# Patient Record
Sex: FEMALE | Race: WHITE | NOT HISPANIC OR LATINO | URBAN - METROPOLITAN AREA
[De-identification: names, ages, dates, MRNs, and addresses within clinical notes are randomized per-mention and may not be internally consistent; named-entity substitution may affect disease eponyms.]

---

## 2023-01-01 ENCOUNTER — NURSING HOME VISIT (OUTPATIENT)
Dept: POST ACUTE CARE | Facility: EXTERNAL LOCATION | Age: 88
End: 2023-01-01
Payer: MEDICARE

## 2023-01-01 VITALS — DIASTOLIC BLOOD PRESSURE: 56 MMHG | SYSTOLIC BLOOD PRESSURE: 99 MMHG | HEART RATE: 87 BPM

## 2023-01-01 VITALS — WEIGHT: 124 LBS | HEART RATE: 85 BPM | DIASTOLIC BLOOD PRESSURE: 79 MMHG | SYSTOLIC BLOOD PRESSURE: 124 MMHG

## 2023-01-01 VITALS — DIASTOLIC BLOOD PRESSURE: 76 MMHG | SYSTOLIC BLOOD PRESSURE: 116 MMHG | WEIGHT: 124 LBS | HEART RATE: 76 BPM

## 2023-01-01 VITALS — HEART RATE: 100 BPM | WEIGHT: 125 LBS | DIASTOLIC BLOOD PRESSURE: 64 MMHG | SYSTOLIC BLOOD PRESSURE: 103 MMHG

## 2023-01-01 VITALS — DIASTOLIC BLOOD PRESSURE: 60 MMHG | SYSTOLIC BLOOD PRESSURE: 105 MMHG | HEART RATE: 68 BPM | WEIGHT: 119 LBS

## 2023-01-01 DIAGNOSIS — U07.1 COVID-19: Primary | ICD-10-CM

## 2023-01-01 DIAGNOSIS — F02.C0 SEVERE ALZHEIMER'S DEMENTIA OF OTHER ONSET WITHOUT BEHAVIORAL DISTURBANCE, PSYCHOTIC DISTURBANCE, MOOD DISTURBANCE, OR ANXIETY (MULTI): Primary | ICD-10-CM

## 2023-01-01 DIAGNOSIS — G30.8 SEVERE ALZHEIMER'S DEMENTIA OF OTHER ONSET WITHOUT BEHAVIORAL DISTURBANCE, PSYCHOTIC DISTURBANCE, MOOD DISTURBANCE, OR ANXIETY (MULTI): Primary | ICD-10-CM

## 2023-01-01 DIAGNOSIS — I10 PRIMARY HYPERTENSION: ICD-10-CM

## 2023-01-01 DIAGNOSIS — R54 SENILE DEBILITY: ICD-10-CM

## 2023-01-01 DIAGNOSIS — F02.C0 SEVERE ALZHEIMER'S DEMENTIA OF OTHER ONSET WITHOUT BEHAVIORAL DISTURBANCE, PSYCHOTIC DISTURBANCE, MOOD DISTURBANCE, OR ANXIETY (MULTI): ICD-10-CM

## 2023-01-01 DIAGNOSIS — G30.8 SEVERE ALZHEIMER'S DEMENTIA OF OTHER ONSET WITHOUT BEHAVIORAL DISTURBANCE, PSYCHOTIC DISTURBANCE, MOOD DISTURBANCE, OR ANXIETY (MULTI): ICD-10-CM

## 2023-01-01 PROCEDURE — 99308 SBSQ NF CARE LOW MDM 20: CPT | Performed by: INTERNAL MEDICINE

## 2023-01-01 ASSESSMENT — ENCOUNTER SYMPTOMS
CARDIOVASCULAR NEGATIVE: 1
RESPIRATORY NEGATIVE: 1
JOINT SWELLING: 0
ACTIVITY CHANGE: 0
APPETITE CHANGE: 0
SORE THROAT: 0
NERVOUS/ANXIOUS: 1
WEAKNESS: 1
DIZZINESS: 0
ARTHRALGIAS: 1
WOUND: 0
GASTROINTESTINAL NEGATIVE: 1

## 2023-03-22 NOTE — LETTER
Patient: Felicity Girard  : 1925    Encounter Date: 2023    Subjective  Patient ID: Felicity Girard is a 89 y.o. female who is long term resident being seen and evaluated for multiple medical problems.    89-year-old female patient was seen for routine care visit, she is deteriorating, she is absolutely not showing any quality of life, patient has a severe dementia, patient is total care, patient cannot sustain any mobility, patient is not able to complain anything, she is essentially bedbound chair bound, weight has not changed significantly in the beginning but now she is losing weight, family wants absolute comfort measures, we are not giving any specific therapeutics, supportive care and conservative care has been given.  Family is aware about poor prognosis.         Review of Systems   Constitutional:  Negative for activity change and appetite change.   HENT:  Negative for sore throat.    Respiratory: Negative.     Cardiovascular: Negative.    Gastrointestinal: Negative.    Genitourinary:  Negative for enuresis and urgency.   Musculoskeletal:  Positive for arthralgias. Negative for joint swelling.   Skin: Negative.  Negative for wound.   Neurological:  Positive for weakness. Negative for dizziness.   Psychiatric/Behavioral:  The patient is nervous/anxious.        Objective  There were no vitals taken for this visit.    Physical Exam  Vitals reviewed.   Constitutional:       Appearance: Normal appearance. She is normal weight.   HENT:      Head: Normocephalic and atraumatic.   Eyes:      Conjunctiva/sclera: Conjunctivae normal.   Cardiovascular:      Rate and Rhythm: Normal rate and regular rhythm.   Pulmonary:      Effort: Pulmonary effort is normal.      Breath sounds: Normal breath sounds.   Abdominal:      Palpations: Abdomen is soft.   Musculoskeletal:         General: Normal range of motion.      Cervical back: Normal range of motion.   Skin:     General: Skin is warm and dry.   Neurological:       General: No focal deficit present.   Psychiatric:         Cognition and Memory: Cognition is impaired. Memory is impaired. She exhibits impaired recent memory.         Assessment/Plan  Problem List Items Addressed This Visit          Nervous    Dementia (CMS/HCC) - Primary       Circulatory    Hypertension   Medications are reviewed, no test or investigations required, complete care and assistance has been provided, prognosis is poor, discussed with nursing staff, at any time hospice like care can be initiated, patient has a severe dementia to the point of total care and dependence and no spontaneous conversation or communication.     Goals    None           Electronically Signed By: Asaf Gilliland MD   3/24/23 10:56 PM

## 2023-03-24 PROBLEM — I10 HYPERTENSION: Status: ACTIVE | Noted: 2023-01-01

## 2023-03-24 PROBLEM — F03.90 DEMENTIA (MULTI): Status: ACTIVE | Noted: 2023-01-01

## 2023-03-25 NOTE — PROGRESS NOTES
Subjective   Patient ID: Felicity Girard is a 89 y.o. female who is long term resident being seen and evaluated for multiple medical problems.    89-year-old female patient was seen for routine care visit, she is deteriorating, she is absolutely not showing any quality of life, patient has a severe dementia, patient is total care, patient cannot sustain any mobility, patient is not able to complain anything, she is essentially bedbound chair bound, weight has not changed significantly in the beginning but now she is losing weight, family wants absolute comfort measures, we are not giving any specific therapeutics, supportive care and conservative care has been given.  Family is aware about poor prognosis.         Review of Systems   Constitutional:  Negative for activity change and appetite change.   HENT:  Negative for sore throat.    Respiratory: Negative.     Cardiovascular: Negative.    Gastrointestinal: Negative.    Genitourinary:  Negative for enuresis and urgency.   Musculoskeletal:  Positive for arthralgias. Negative for joint swelling.   Skin: Negative.  Negative for wound.   Neurological:  Positive for weakness. Negative for dizziness.   Psychiatric/Behavioral:  The patient is nervous/anxious.        Objective   There were no vitals taken for this visit.    Physical Exam  Vitals reviewed.   Constitutional:       Appearance: Normal appearance. She is normal weight.   HENT:      Head: Normocephalic and atraumatic.   Eyes:      Conjunctiva/sclera: Conjunctivae normal.   Cardiovascular:      Rate and Rhythm: Normal rate and regular rhythm.   Pulmonary:      Effort: Pulmonary effort is normal.      Breath sounds: Normal breath sounds.   Abdominal:      Palpations: Abdomen is soft.   Musculoskeletal:         General: Normal range of motion.      Cervical back: Normal range of motion.   Skin:     General: Skin is warm and dry.   Neurological:      General: No focal deficit present.   Psychiatric:         Cognition  and Memory: Cognition is impaired. Memory is impaired. She exhibits impaired recent memory.         Assessment/Plan   Problem List Items Addressed This Visit          Nervous    Dementia (CMS/HCC) - Primary       Circulatory    Hypertension   Medications are reviewed, no test or investigations required, complete care and assistance has been provided, prognosis is poor, discussed with nursing staff, at any time hospice like care can be initiated, patient has a severe dementia to the point of total care and dependence and no spontaneous conversation or communication.     Goals    None

## 2023-04-26 NOTE — LETTER
Patient: Felicity Girard  : 1925    Encounter Date: 2023    Subjective  Patient ID: Felicity Girard is a 89 y.o. female who is acute skilled care being seen and evaluated for multiple medical problems.    89-year-old female patient was seen for follow-up, condition continued to deteriorate, more than 10% weight loss is seen, patient remains under hospice care, she is bedbound, she is lethargic, family member was not present, atenolol and donepezil has been kept, very scant therapeutics has been kept for several years she has been here, no communication, appetite remains poor, oral feeding has been deteriorating, patient's prognosis continue to decline significantly, patient is kept on a maximum comfort measures, severe dementia is there.         Review of Systems  Constitutional:  Negative for activity change and appetite change.   HENT:  Negative for sore throat.    Respiratory: Negative.     Cardiovascular: Negative.    Gastrointestinal: Negative.    Genitourinary:  Negative for enuresis and urgency.   Musculoskeletal:  Positive for arthralgias. Negative for joint swelling.   Skin: Negative.  Negative for wound.   Neurological:  Positive for weakness. Negative for dizziness.   Psychiatric/Behavioral:  The patient is letharic  Objective  /60   Pulse 68   Wt 54 kg (119 lb)     Physical Exam  Vitals reviewed.   Constitutional:       Appearance: Normal appearance. She is normal weight.   HENT:      Head: Normocephalic and atraumatic.   Eyes:      Conjunctiva/sclera: Conjunctivae normal.   Cardiovascular:      Rate and Rhythm: Normal rate and regular rhythm.   Pulmonary:      Effort: Pulmonary effort is normal.      Breath sounds: Normal breath sounds.   Abdominal:      Palpations: Abdomen is soft.   Musculoskeletal:         General: Normal range of motion.      Cervical back: Normal range of motion.   Skin:     General: Skin is warm and dry.   Neurological:      General: No focal deficit present.    Psychiatric:         Cognition and Memory: Cognition is impaired. Memory is impaired. She exhibits impaired recent memory.         Assessment/Plan  Problem List Items Addressed This Visit          Nervous    Dementia (CMS/HCC) - Primary       Circulatory    Hypertension     Other Visit Diagnoses       Senile debility            Not going to do well, senile debility persist, discontinue atenolol and donepezil, only comfort medications, pain control, secretion control, comfortable breathing, assist with ADLs, if needed assist with feeding, no behavioral disturbance, no agitation, hospice care and terminal care has been kept and maintained, extremely poor prognosis, discussed with nursing staff.     Goals    None           Electronically Signed By: Asaf Gilliland MD   4/27/23  9:15 PM

## 2023-04-27 NOTE — LETTER
Patient: Felicity Girard  : 1925    Encounter Date: 2023    Error, see previous note    Electronically Signed By: Asaf Gilliland MD   23  8:00 AM

## 2023-04-28 NOTE — PROGRESS NOTES
Subjective   Patient ID: Felicity Girard is a 89 y.o. female who is acute skilled care being seen and evaluated for multiple medical problems.    89-year-old female patient was seen for follow-up, condition continued to deteriorate, more than 10% weight loss is seen, patient remains under hospice care, she is bedbound, she is lethargic, family member was not present, atenolol and donepezil has been kept, very scant therapeutics has been kept for several years she has been here, no communication, appetite remains poor, oral feeding has been deteriorating, patient's prognosis continue to decline significantly, patient is kept on a maximum comfort measures, severe dementia is there.         Review of Systems  Constitutional:  Negative for activity change and appetite change.   HENT:  Negative for sore throat.    Respiratory: Negative.     Cardiovascular: Negative.    Gastrointestinal: Negative.    Genitourinary:  Negative for enuresis and urgency.   Musculoskeletal:  Positive for arthralgias. Negative for joint swelling.   Skin: Negative.  Negative for wound.   Neurological:  Positive for weakness. Negative for dizziness.   Psychiatric/Behavioral:  The patient is letharic  Objective   /60   Pulse 68   Wt 54 kg (119 lb)     Physical Exam  Vitals reviewed.   Constitutional:       Appearance: Normal appearance. She is normal weight.   HENT:      Head: Normocephalic and atraumatic.   Eyes:      Conjunctiva/sclera: Conjunctivae normal.   Cardiovascular:      Rate and Rhythm: Normal rate and regular rhythm.   Pulmonary:      Effort: Pulmonary effort is normal.      Breath sounds: Normal breath sounds.   Abdominal:      Palpations: Abdomen is soft.   Musculoskeletal:         General: Normal range of motion.      Cervical back: Normal range of motion.   Skin:     General: Skin is warm and dry.   Neurological:      General: No focal deficit present.   Psychiatric:         Cognition and Memory: Cognition is impaired. Memory  is impaired. She exhibits impaired recent memory.         Assessment/Plan   Problem List Items Addressed This Visit          Nervous    Dementia (CMS/[facility]) - Primary       Circulatory    Hypertension     Other Visit Diagnoses       Senile debility            Not going to do well, senile debility persist, discontinue atenolol and donepezil, only comfort medications, pain control, secretion control, comfortable breathing, assist with ADLs, if needed assist with feeding, no behavioral disturbance, no agitation, hospice care and terminal care has been kept and maintained, extremely poor prognosis, discussed with nursing staff.     Goals    None

## 2023-05-24 NOTE — LETTER
Patient: Felicity Girard  : 1925    Encounter Date: 2023    Subjective  Patient ID: Felicity Girard is a 89 y.o. female who is long term resident being seen and evaluated for multiple medical problems.    89-year-old female patient was seen by myself in presence of nursing staff, she continued to deteriorate slowly and gradually, she continued to exhibit cognitive impairment, she continued to remain bedbound, lethargic, interestingly she eats well, she does not have any weight loss, she stays comfortable, comfort measures and supportive care is what family has asked, she remains under palliative to hospice care.  Very scant therapeutics has been kept and maintained, she remains calm and comfortable, no restlessness, agitation, no increasing pain or discomfort, no falls.         Review of Systems  Constitutional:  Negative for activity change and appetite change.   HENT:  Negative for sore throat.    Respiratory: Negative.     Cardiovascular: Negative.    Gastrointestinal: Negative.    Genitourinary:  Negative for enuresis and urgency.   Musculoskeletal:  Positive for arthralgias. Negative for joint swelling.   Skin: Negative.  Negative for wound.   Neurological:  Positive for weakness. Negative for dizziness.   Psychiatric/Behavioral:  The patient is letharic  Objective  /79   Pulse 85   Wt 56.2 kg (124 lb)     Physical Exam  Vitals reviewed.   Constitutional:       Appearance: Normal appearance. She is normal weight.   HENT:      Head: Normocephalic and atraumatic.   Eyes:      Conjunctiva/sclera: Conjunctivae normal.   Cardiovascular:      Rate and Rhythm: Normal rate and regular rhythm.   Pulmonary:      Effort: Pulmonary effort is normal.      Breath sounds: Normal breath sounds.   Abdominal:      Palpations: Abdomen is soft.   Musculoskeletal:         General: Normal range of motion.      Cervical back: Normal range of motion.   Skin:     General: Skin is warm and dry.   Neurological:       General: No focal deficit present.   Psychiatric:         Cognition and Memory: Cognition is impaired. Memory is impaired.   Assessment/Plan  Problem List Items Addressed This Visit          Nervous    Dementia (CMS/HCC) - Primary       Circulatory    Hypertension       Other    Senile debility   Predominantly elderly female patient, severe dementia, severe senile debility, history of cardiomyopathy, very scant therapeutics, poor prognosis, assisted for daily routine and care, confined to the facility for long-term care, will continue medications as listed, poor prognosis, family is aware.     Goals    None           Electronically Signed By: Asaf Gilliland MD   5/29/23  5:55 PM

## 2023-05-29 PROBLEM — R54 SENILE DEBILITY: Status: ACTIVE | Noted: 2023-01-01

## 2023-05-29 NOTE — PROGRESS NOTES
Subjective   Patient ID: Felicity Girard is a 89 y.o. female who is long term resident being seen and evaluated for multiple medical problems.    89-year-old female patient was seen by myself in presence of nursing staff, she continued to deteriorate slowly and gradually, she continued to exhibit cognitive impairment, she continued to remain bedbound, lethargic, interestingly she eats well, she does not have any weight loss, she stays comfortable, comfort measures and supportive care is what family has asked, she remains under palliative to hospice care.  Very scant therapeutics has been kept and maintained, she remains calm and comfortable, no restlessness, agitation, no increasing pain or discomfort, no falls.         Review of Systems  Constitutional:  Negative for activity change and appetite change.   HENT:  Negative for sore throat.    Respiratory: Negative.     Cardiovascular: Negative.    Gastrointestinal: Negative.    Genitourinary:  Negative for enuresis and urgency.   Musculoskeletal:  Positive for arthralgias. Negative for joint swelling.   Skin: Negative.  Negative for wound.   Neurological:  Positive for weakness. Negative for dizziness.   Psychiatric/Behavioral:  The patient is letharic  Objective   /79   Pulse 85   Wt 56.2 kg (124 lb)     Physical Exam  Vitals reviewed.   Constitutional:       Appearance: Normal appearance. She is normal weight.   HENT:      Head: Normocephalic and atraumatic.   Eyes:      Conjunctiva/sclera: Conjunctivae normal.   Cardiovascular:      Rate and Rhythm: Normal rate and regular rhythm.   Pulmonary:      Effort: Pulmonary effort is normal.      Breath sounds: Normal breath sounds.   Abdominal:      Palpations: Abdomen is soft.   Musculoskeletal:         General: Normal range of motion.      Cervical back: Normal range of motion.   Skin:     General: Skin is warm and dry.   Neurological:      General: No focal deficit present.   Psychiatric:         Cognition and  Memory: Cognition is impaired. Memory is impaired.   Assessment/Plan   Problem List Items Addressed This Visit          Nervous    Dementia (CMS/HCC) - Primary       Circulatory    Hypertension       Other    Senile debility   Predominantly elderly female patient, severe dementia, severe senile debility, history of cardiomyopathy, very scant therapeutics, poor prognosis, assisted for daily routine and care, confined to the facility for long-term care, will continue medications as listed, poor prognosis, family is aware.     Goals    None

## 2023-06-28 NOTE — LETTER
Patient: Felicity Girard  : 1925    Encounter Date: 2023    Subjective  Patient ID: Felicity Girard is a 89 y.o. female who is long term resident being seen and evaluated for multiple medical problems.    89-year-old female patient was seen for regular follow-up, she remains calm and comfortable, she did not have any fall or event, severe dementia, confusion, disorientation, confinement to the memory support unit persist.  Nursing staff says that usually she is calm and comfortable, patient's weight has not changed significantly, patient remains on very limited therapeutics, no major events or concerns, absolutes comfort care and supportive care has been continued.  Family member was not at bedside, sleep-wake cycle has been well kept and maintained, confined to the nursing facility for several years without any major problem although cognitive decline has been consistent and persistent         Review of Systems  Constitutional:  Negative for activity change and appetite change.   HENT:  Negative for sore throat.    Respiratory: Negative.     Cardiovascular: Negative.    Gastrointestinal: Negative.    Genitourinary:  Negative for enuresis and urgency.   Musculoskeletal:  Positive for arthralgias. Negative for joint swelling.   Skin: Negative.  Negative for wound.   Neurological:  Positive for weakness. Negative for dizziness.   Psychiatric/Behavioral:  The patient is nervous/anxious.    Objective  /64   Pulse 100   Wt 56.7 kg (125 lb)     Physical Exam  Vitals reviewed.   Constitutional:       Appearance: Normal appearance. She is normal weight.   HENT:      Head: Normocephalic and atraumatic.   Eyes:      Conjunctiva/sclera: Conjunctivae normal.   Cardiovascular:      Rate and Rhythm: Normal rate and regular rhythm.   Pulmonary:      Effort: Pulmonary effort is normal.      Breath sounds: Normal breath sounds.   Abdominal:      Palpations: Abdomen is soft.   Musculoskeletal:         General: Normal  range of motion.      Cervical back: Normal range of motion.   Skin:     General: Skin is warm and dry.   Neurological:      General: No focal deficit present.   Psychiatric:         Cognition and Memory: Cognition is impaired  Assessment/Plan  Problem List Items Addressed This Visit       Dementia (CMS/[facility]) - Primary    Hypertension    Senile debility   BP readings are reviewed, therapeutics are reviewed, no pain, no restlessness, no agitation, no anxiety, no apathy, supportive care, comfort measures, periodic assessment and follow-up, routine nursing care, make sure that patient is fed properly, make sure that patient is hydrated properly, prognosis remains impaired because of aging and severe cognitive impairment, discussed with nursing staff and no major intervention to be taken.     Goals    None           Electronically Signed By: Asaf Gilliland MD   7/3/23  9:04 PM

## 2023-07-04 NOTE — PROGRESS NOTES
Subjective   Patient ID: Felicity Girard is a 89 y.o. female who is long term resident being seen and evaluated for multiple medical problems.    89-year-old female patient was seen for regular follow-up, she remains calm and comfortable, she did not have any fall or event, severe dementia, confusion, disorientation, confinement to the memory support unit persist.  Nursing staff says that usually she is calm and comfortable, patient's weight has not changed significantly, patient remains on very limited therapeutics, no major events or concerns, absolutes comfort care and supportive care has been continued.  Family member was not at bedside, sleep-wake cycle has been well kept and maintained, confined to the nursing facility for several years without any major problem although cognitive decline has been consistent and persistent         Review of Systems  Constitutional:  Negative for activity change and appetite change.   HENT:  Negative for sore throat.    Respiratory: Negative.     Cardiovascular: Negative.    Gastrointestinal: Negative.    Genitourinary:  Negative for enuresis and urgency.   Musculoskeletal:  Positive for arthralgias. Negative for joint swelling.   Skin: Negative.  Negative for wound.   Neurological:  Positive for weakness. Negative for dizziness.   Psychiatric/Behavioral:  The patient is nervous/anxious.    Objective   /64   Pulse 100   Wt 56.7 kg (125 lb)     Physical Exam  Vitals reviewed.   Constitutional:       Appearance: Normal appearance. She is normal weight.   HENT:      Head: Normocephalic and atraumatic.   Eyes:      Conjunctiva/sclera: Conjunctivae normal.   Cardiovascular:      Rate and Rhythm: Normal rate and regular rhythm.   Pulmonary:      Effort: Pulmonary effort is normal.      Breath sounds: Normal breath sounds.   Abdominal:      Palpations: Abdomen is soft.   Musculoskeletal:         General: Normal range of motion.      Cervical back: Normal range of motion.    Skin:     General: Skin is warm and dry.   Neurological:      General: No focal deficit present.   Psychiatric:         Cognition and Memory: Cognition is impaired  Assessment/Plan   Problem List Items Addressed This Visit       Dementia (CMS/[facility]) - Primary    Hypertension    Senile debility   BP readings are reviewed, therapeutics are reviewed, no pain, no restlessness, no agitation, no anxiety, no apathy, supportive care, comfort measures, periodic assessment and follow-up, routine nursing care, make sure that patient is fed properly, make sure that patient is hydrated properly, prognosis remains impaired because of aging and severe cognitive impairment, discussed with nursing staff and no major intervention to be taken.     Goals    None

## 2023-07-26 NOTE — LETTER
Patient: Felicity Girard  : 1925    Encounter Date: 2023    Subjective  Patient ID: Felicity Girard is a 89 y.o. female who is long term resident being seen and evaluated for multiple medical problems.    This patient has a COVID-19 infection as a part of the COVID-19 infection and the whole unit.  She is declining very fast, she is more or less lethargic and unresponsive, her appetite has been poor, family has been made aware about poor physical condition, she is unable to talk, she is confined to the facility for more than 10 years, this time it appears that this COVID-19 infection may be too complicated to her.  Patient has been kept under hospice care and comfort measures.  Patient is unable to complain anything, essentially bedbound, unresponsive.         Review of Systems  Constitutional:  anorexia  HENT:  congestion  Respiratory: cough    Cardiovascular: Negative.    Gastrointestinal: Negative.    Genitourinary:  Negative for enuresis and urgency.   Musculoskeletal:  Positive for arthralgias. Negative for joint swelling.   Skin: Negative.  Negative for wound.   Neurological:  Positive for weakness. Negative for dizziness.   Psychiatric/Behavioral:  The patient is letharic  Objective  /76   Pulse 76   Wt 56.2 kg (124 lb)     Physical Exam  Vitals reviewed.   Constitutional:       Appearance: Normal appearance. She is normal weight.   HENT:      Head: Normocephalic and atraumatic.   Eyes:      Conjunctiva/sclera: Conjunctivae normal.   Cardiovascular:      Rate and Rhythm: Normal rate and regular rhythm.   Pulmonary:      Effort: Pulmonary effort is normal.      Breath sounds: Normal breath sounds.   Abdominal:      Palpations: Abdomen is soft.   Musculoskeletal:         General: Normal range of motion.      Cervical back: Normal range of motion.   Skin:     General: Skin is warm and dry. Poor turger  Neurological:      General: No focal deficit present. Lethargic and obtunded  Psychiatric:          Cognition and Memory: Cognition is impaired.  Assessment/Plan  Problem List Items Addressed This Visit       Dementia (CMS/HCC)    Hypertension    Senile debility     Other Visit Diagnoses       COVID-19    -  Primary        Patient is declining, prognosis is extremely poor, morphine therapy to be continued, symptom management to be continued.  Most of the medications has been withdrawn.  Extremely poor prognosis, keep notification to the family depends upon patient's clinical condition, discussed with nursing staff, morphine was renewed.     Goals    None           Electronically Signed By: Asaf Gilliland MD   7/30/23  5:51 PM

## 2023-07-30 NOTE — PROGRESS NOTES
Subjective   Patient ID: Felicity Girard is a 89 y.o. female who is long term resident being seen and evaluated for multiple medical problems.    This patient has a COVID-19 infection as a part of the COVID-19 infection and the whole unit.  She is declining very fast, she is more or less lethargic and unresponsive, her appetite has been poor, family has been made aware about poor physical condition, she is unable to talk, she is confined to the facility for more than 10 years, this time it appears that this COVID-19 infection may be too complicated to her.  Patient has been kept under hospice care and comfort measures.  Patient is unable to complain anything, essentially bedbound, unresponsive.         Review of Systems  Constitutional:  anorexia  HENT:  congestion  Respiratory: cough    Cardiovascular: Negative.    Gastrointestinal: Negative.    Genitourinary:  Negative for enuresis and urgency.   Musculoskeletal:  Positive for arthralgias. Negative for joint swelling.   Skin: Negative.  Negative for wound.   Neurological:  Positive for weakness. Negative for dizziness.   Psychiatric/Behavioral:  The patient is letharic  Objective   /76   Pulse 76   Wt 56.2 kg (124 lb)     Physical Exam  Vitals reviewed.   Constitutional:       Appearance: Normal appearance. She is normal weight.   HENT:      Head: Normocephalic and atraumatic.   Eyes:      Conjunctiva/sclera: Conjunctivae normal.   Cardiovascular:      Rate and Rhythm: Normal rate and regular rhythm.   Pulmonary:      Effort: Pulmonary effort is normal.      Breath sounds: Normal breath sounds.   Abdominal:      Palpations: Abdomen is soft.   Musculoskeletal:         General: Normal range of motion.      Cervical back: Normal range of motion.   Skin:     General: Skin is warm and dry. Poor turger  Neurological:      General: No focal deficit present. Lethargic and obtunded  Psychiatric:         Cognition and Memory: Cognition is impaired.  Assessment/Plan    Problem List Items Addressed This Visit       Dementia (CMS/HCC)    Hypertension    Senile debility     Other Visit Diagnoses       COVID-19    -  Primary        Patient is declining, prognosis is extremely poor, morphine therapy to be continued, symptom management to be continued.  Most of the medications has been withdrawn.  Extremely poor prognosis, keep notification to the family depends upon patient's clinical condition, discussed with nursing staff, morphine was renewed.     Goals    None